# Patient Record
Sex: FEMALE | Race: WHITE | NOT HISPANIC OR LATINO
[De-identification: names, ages, dates, MRNs, and addresses within clinical notes are randomized per-mention and may not be internally consistent; named-entity substitution may affect disease eponyms.]

---

## 2021-11-23 ENCOUNTER — TRANSCRIPTION ENCOUNTER (OUTPATIENT)
Age: 21
End: 2021-11-23

## 2023-02-17 PROBLEM — Z00.00 ENCOUNTER FOR PREVENTIVE HEALTH EXAMINATION: Status: ACTIVE | Noted: 2023-02-17

## 2023-02-19 ENCOUNTER — OUTPATIENT (OUTPATIENT)
Dept: OUTPATIENT SERVICES | Facility: HOSPITAL | Age: 23
LOS: 1 days | End: 2023-02-19
Payer: COMMERCIAL

## 2023-02-19 DIAGNOSIS — M54.6 PAIN IN THORACIC SPINE: ICD-10-CM

## 2023-02-19 DIAGNOSIS — Z00.8 ENCOUNTER FOR OTHER GENERAL EXAMINATION: ICD-10-CM

## 2023-02-19 PROCEDURE — 72157 MRI CHEST SPINE W/O & W/DYE: CPT | Mod: 26

## 2023-02-19 PROCEDURE — A9579: CPT

## 2023-02-19 PROCEDURE — 72157 MRI CHEST SPINE W/O & W/DYE: CPT

## 2023-02-20 DIAGNOSIS — M54.6 PAIN IN THORACIC SPINE: ICD-10-CM

## 2023-04-04 ENCOUNTER — OUTPATIENT (OUTPATIENT)
Dept: OUTPATIENT SERVICES | Facility: HOSPITAL | Age: 23
LOS: 1 days | End: 2023-04-04
Payer: COMMERCIAL

## 2023-04-04 DIAGNOSIS — Z00.8 ENCOUNTER FOR OTHER GENERAL EXAMINATION: ICD-10-CM

## 2023-04-04 DIAGNOSIS — M50.20 OTHER CERVICAL DISC DISPLACEMENT, UNSPECIFIED CERVICAL REGION: ICD-10-CM

## 2023-04-04 PROCEDURE — 72141 MRI NECK SPINE W/O DYE: CPT

## 2023-04-04 PROCEDURE — 72141 MRI NECK SPINE W/O DYE: CPT | Mod: 26

## 2023-04-05 DIAGNOSIS — M50.20 OTHER CERVICAL DISC DISPLACEMENT, UNSPECIFIED CERVICAL REGION: ICD-10-CM

## 2023-08-26 ENCOUNTER — NON-APPOINTMENT (OUTPATIENT)
Age: 23
End: 2023-08-26

## 2023-08-27 ENCOUNTER — EMERGENCY (EMERGENCY)
Facility: HOSPITAL | Age: 23
LOS: 0 days | Discharge: HOME | End: 2023-08-27
Attending: STUDENT IN AN ORGANIZED HEALTH CARE EDUCATION/TRAINING PROGRAM
Payer: COMMERCIAL

## 2023-08-27 VITALS
HEART RATE: 105 BPM | RESPIRATION RATE: 19 BRPM | SYSTOLIC BLOOD PRESSURE: 111 MMHG | DIASTOLIC BLOOD PRESSURE: 58 MMHG | TEMPERATURE: 97 F | OXYGEN SATURATION: 98 %

## 2023-08-27 VITALS
HEART RATE: 109 BPM | TEMPERATURE: 98 F | DIASTOLIC BLOOD PRESSURE: 64 MMHG | SYSTOLIC BLOOD PRESSURE: 101 MMHG | OXYGEN SATURATION: 99 % | RESPIRATION RATE: 19 BRPM

## 2023-08-27 DIAGNOSIS — Z88.2 ALLERGY STATUS TO SULFONAMIDES: ICD-10-CM

## 2023-08-27 DIAGNOSIS — Z88.0 ALLERGY STATUS TO PENICILLIN: ICD-10-CM

## 2023-08-27 DIAGNOSIS — R21 RASH AND OTHER NONSPECIFIC SKIN ERUPTION: ICD-10-CM

## 2023-08-27 DIAGNOSIS — L27.0 GENERALIZED SKIN ERUPTION DUE TO DRUGS AND MEDICAMENTS TAKEN INTERNALLY: ICD-10-CM

## 2023-08-27 DIAGNOSIS — T36.8X5A ADVERSE EFFECT OF OTHER SYSTEMIC ANTIBIOTICS, INITIAL ENCOUNTER: ICD-10-CM

## 2023-08-27 LAB
ALBUMIN SERPL ELPH-MCNC: 4.8 G/DL — SIGNIFICANT CHANGE UP (ref 3.5–5.2)
ALP SERPL-CCNC: 88 U/L — SIGNIFICANT CHANGE UP (ref 30–115)
ALT FLD-CCNC: 97 U/L — HIGH (ref 0–41)
ANION GAP SERPL CALC-SCNC: 13 MMOL/L — SIGNIFICANT CHANGE UP (ref 7–14)
AST SERPL-CCNC: 119 U/L — HIGH (ref 0–41)
BASOPHILS # BLD AUTO: 0 K/UL — SIGNIFICANT CHANGE UP (ref 0–0.2)
BASOPHILS NFR BLD AUTO: 0 % — SIGNIFICANT CHANGE UP (ref 0–1)
BILIRUB SERPL-MCNC: 0.2 MG/DL — SIGNIFICANT CHANGE UP (ref 0.2–1.2)
BUN SERPL-MCNC: 7 MG/DL — LOW (ref 10–20)
CALCIUM SERPL-MCNC: 9 MG/DL — SIGNIFICANT CHANGE UP (ref 8.4–10.5)
CHLORIDE SERPL-SCNC: 100 MMOL/L — SIGNIFICANT CHANGE UP (ref 98–110)
CO2 SERPL-SCNC: 24 MMOL/L — SIGNIFICANT CHANGE UP (ref 17–32)
CREAT SERPL-MCNC: 0.7 MG/DL — SIGNIFICANT CHANGE UP (ref 0.7–1.5)
EGFR: 124 ML/MIN/1.73M2 — SIGNIFICANT CHANGE UP
EOSINOPHIL # BLD AUTO: 0.03 K/UL — SIGNIFICANT CHANGE UP (ref 0–0.7)
EOSINOPHIL NFR BLD AUTO: 0.9 % — SIGNIFICANT CHANGE UP (ref 0–8)
GIANT PLATELETS BLD QL SMEAR: PRESENT — SIGNIFICANT CHANGE UP
GLUCOSE SERPL-MCNC: 96 MG/DL — SIGNIFICANT CHANGE UP (ref 70–99)
HCG SERPL QL: NEGATIVE — SIGNIFICANT CHANGE UP
HCT VFR BLD CALC: 40.4 % — SIGNIFICANT CHANGE UP (ref 37–47)
HGB BLD-MCNC: 13.3 G/DL — SIGNIFICANT CHANGE UP (ref 12–16)
LACTATE SERPL-SCNC: 1.1 MMOL/L — SIGNIFICANT CHANGE UP (ref 0.7–2)
LYMPHOCYTES # BLD AUTO: 0.13 K/UL — LOW (ref 1.2–3.4)
LYMPHOCYTES # BLD AUTO: 4.4 % — LOW (ref 20.5–51.1)
MANUAL SMEAR VERIFICATION: SIGNIFICANT CHANGE UP
MCHC RBC-ENTMCNC: 31.8 PG — HIGH (ref 27–31)
MCHC RBC-ENTMCNC: 32.9 G/DL — SIGNIFICANT CHANGE UP (ref 32–37)
MCV RBC AUTO: 96.7 FL — SIGNIFICANT CHANGE UP (ref 81–99)
MONOCYTES # BLD AUTO: 0.1 K/UL — SIGNIFICANT CHANGE UP (ref 0.1–0.6)
MONOCYTES NFR BLD AUTO: 3.5 % — SIGNIFICANT CHANGE UP (ref 1.7–9.3)
MYELOCYTES NFR BLD: 0.9 % — HIGH (ref 0–0)
NEUTROPHILS # BLD AUTO: 2.7 K/UL — SIGNIFICANT CHANGE UP (ref 1.4–6.5)
NEUTROPHILS NFR BLD AUTO: 85.1 % — HIGH (ref 42.2–75.2)
NEUTS BAND # BLD: 5.2 % — SIGNIFICANT CHANGE UP (ref 0–6)
NRBC # BLD: 2 /100 — HIGH (ref 0–0)
NRBC # BLD: SIGNIFICANT CHANGE UP /100 WBCS (ref 0–0)
PLAT MORPH BLD: NORMAL — SIGNIFICANT CHANGE UP
PLATELET # BLD AUTO: 118 K/UL — LOW (ref 130–400)
PMV BLD: 9.2 FL — SIGNIFICANT CHANGE UP (ref 7.4–10.4)
POTASSIUM SERPL-MCNC: 4.5 MMOL/L — SIGNIFICANT CHANGE UP (ref 3.5–5)
POTASSIUM SERPL-SCNC: 4.5 MMOL/L — SIGNIFICANT CHANGE UP (ref 3.5–5)
PROT SERPL-MCNC: 7.3 G/DL — SIGNIFICANT CHANGE UP (ref 6–8)
RBC # BLD: 4.18 M/UL — LOW (ref 4.2–5.4)
RBC # FLD: 13.9 % — SIGNIFICANT CHANGE UP (ref 11.5–14.5)
RBC BLD AUTO: NORMAL — SIGNIFICANT CHANGE UP
SODIUM SERPL-SCNC: 137 MMOL/L — SIGNIFICANT CHANGE UP (ref 135–146)
WBC # BLD: 2.99 K/UL — LOW (ref 4.8–10.8)
WBC # FLD AUTO: 2.99 K/UL — LOW (ref 4.8–10.8)

## 2023-08-27 PROCEDURE — 85025 COMPLETE CBC W/AUTO DIFF WBC: CPT

## 2023-08-27 PROCEDURE — 83605 ASSAY OF LACTIC ACID: CPT

## 2023-08-27 PROCEDURE — 36415 COLL VENOUS BLD VENIPUNCTURE: CPT

## 2023-08-27 PROCEDURE — 99284 EMERGENCY DEPT VISIT MOD MDM: CPT

## 2023-08-27 PROCEDURE — 99283 EMERGENCY DEPT VISIT LOW MDM: CPT

## 2023-08-27 PROCEDURE — 80053 COMPREHEN METABOLIC PANEL: CPT

## 2023-08-27 PROCEDURE — 84703 CHORIONIC GONADOTROPIN ASSAY: CPT

## 2023-08-27 RX ORDER — SODIUM CHLORIDE 9 MG/ML
1000 INJECTION, SOLUTION INTRAVENOUS ONCE
Refills: 0 | Status: COMPLETED | OUTPATIENT
Start: 2023-08-27 | End: 2023-08-27

## 2023-08-27 RX ORDER — DIPHENHYDRAMINE HCL 50 MG
25 CAPSULE ORAL ONCE
Refills: 0 | Status: COMPLETED | OUTPATIENT
Start: 2023-08-27 | End: 2023-08-27

## 2023-08-27 RX ORDER — ACETAMINOPHEN 500 MG
650 TABLET ORAL ONCE
Refills: 0 | Status: COMPLETED | OUTPATIENT
Start: 2023-08-27 | End: 2023-08-27

## 2023-08-27 RX ADMIN — Medication 20 MILLIGRAM(S): at 20:43

## 2023-08-27 RX ADMIN — Medication 325 MILLIGRAM(S): at 18:40

## 2023-08-27 RX ADMIN — SODIUM CHLORIDE 1000 MILLILITER(S): 9 INJECTION, SOLUTION INTRAVENOUS at 18:40

## 2023-08-27 RX ADMIN — Medication 25 MILLIGRAM(S): at 20:43

## 2023-08-27 NOTE — ED PROVIDER NOTE - CLINICAL SUMMARY MEDICAL DECISION MAKING FREE TEXT BOX
23-year-old female no past medical presenting with rash.  States that she is on her last day of Bactrim today, developed a fever on Friday and then had a diffuse erythematous rash to the body.  Rash began last night.  Denies any oral mucosal involvement.  Denies any conjunctivitis.  Denies any dysuria. Denies any vomiting or diarrhea.  States that she was on Bactrim for Bartholin's abscess which has now completely resolved. exam with Erythematous rash to body, no oral mucosal department.  No conjunctivitis.  Labs reviewed, LFTs less than 3 times upper limit of normal.  Vitals remained stable.  Offered admission however patient preferred to be discharged with close follow-up.  She is very strict return precautions ghanshyam in detail with the patient and her mother including sloughing of skin, blistering, progression of rash, conjunctivitis, oral mucosal involvement, vomiting or diarrhea.  Patient understands to return immediately for reevaluation should they develop.

## 2023-08-27 NOTE — ED ADULT NURSE REASSESSMENT NOTE - NS ED NURSE REASSESS COMMENT FT1
Pt reassessed A/O times 4 Vs stable report filling slight better medication is given as per order did eat 755 tolerated well ,pt is seen evaluate by ED attending clear  to go home with family members NAD noted verbalize understanding  of instruction given . Pt reassessed A/O times 4 Vs stable report filling slight better medication is given as per order did eat 75% tolerated well ,pt is seen evaluate by ED attending clear  to go home with family members NAD noted verbalize understanding  of instruction given .

## 2023-08-27 NOTE — ED PROVIDER NOTE - OBJECTIVE STATEMENT
23-year-old female no past medical presenting with rash.  States that she is on her last day of Bactrim today, developed a fever on Friday and then had a diffuse erythematous rash to the body.  Rash began last night.  Denies any oral mucosal involvement.  Denies any conjunctivitis.  Denies any dysuria. Denies any vomiting or diarrhea.  States that she was on Bactrim for Bartholin's abscess which has now completely resolved.

## 2023-08-27 NOTE — ED ADULT TRIAGE NOTE - CHIEF COMPLAINT QUOTE
pt on Bactrim x 1 week for bartholin cyst. pt now has rash and fever. pt states her cyst has improved

## 2023-08-27 NOTE — ED PROVIDER NOTE - PATIENT PORTAL LINK FT
You can access the FollowMyHealth Patient Portal offered by [facility] by registering at the following website: [email or web address]. By joining [employer]’s FollowMyHealth portal, you will also be able to view your health information using other applications (apps) compatible with our system.

## 2023-08-27 NOTE — ED PROVIDER NOTE - CARE PROVIDER_API CALL
Homar Salamanca [facility]  Dermatology  [facility] [place] [address]  Phone: [phone]  Fax: [phone]  Follow Up Time: 1-3 Days

## 2023-08-27 NOTE — ED PROVIDER NOTE - PHYSICAL EXAMINATION
CONSTITUTIONAL: Well-developed; well-nourished; in no acute distress.   SKIN: warm, dry. macular erythematous rash diffusely, no blistering, no sloughing of skin. no oral mucosal involvement. no conjunctivitis.   HEAD: Normocephalic; atraumatic.  EYES: PERRL, EOMI, no conjunctival erythema  ENT: No nasal discharge; airway clear.  NECK: Supple; non tender.  CARD: S1, S2 normal; no murmurs, gallops, or rubs. Regular rate and rhythm.   RESP: No wheezes, rales or rhonchi.  ABD: soft ntnd.  EXT: Normal ROM.  No clubbing, cyanosis or edema.   NEURO: Alert, oriented, grossly unremarkable  PSYCH: Cooperative, appropriate.

## 2023-08-27 NOTE — ED PROVIDER NOTE - PROGRESS NOTE DETAILS
DC: offered admission for monitoring vs dc with steroids and close follow up with derm- patient preferred dc.

## 2023-08-27 NOTE — ED PROVIDER NOTE - NSFOLLOWUPINSTRUCTIONS_ED_ALL_ED_FT
Our Emergency Department Referral Coordinators will be reaching out to you in the next 24-48 hours from 9:00am to 5:00pm with a follow up appointment. Please expect a phone call from the hospital in that time frame. If you do not receive a call or if you have any questions or concerns, you can reach them at   [phone]    Drug Rash  Close-up of red and inflamed skin around a bandage after an injection.   A drug rash occurs when a medicine causes a change in the color or texture of the skin. It can develop minutes, hours, or days after you take the medicine. The rash may appear on a small area of skin or all over your body.    What are the causes?  This condition may be caused by one of these three conditions:  An allergic reaction to the medicine.  An unwanted side effect of a certain medicine.  Extreme sensitivity to sunlight caused by the medicine.  What increases the risk?  If you take any of these medicines that make your skin sensitive to light and are exposed to sunlight, it can make you more likely to develop this condition:  Antibiotics, including tetracyclines and sulfa medicines.  Antifungals.  Antihistamines.  Diuretics.  Retinoids, such as isotretinoin.  Statins.  NSAIDs.  What are the signs or symptoms?  A person scratching their arm.  Symptoms of this condition include:  Redness.  Tiny bumps.  Peeling.  Itching.  Itchy welts (hives).  Swelling.  How is this diagnosed?  This condition may be diagnosed based on:  A physical exam.  Tests to find out which medicine caused the rash. These tests may include:  Skin tests.  Blood tests.  How is this treated?  This condition is treated with medicines, including:  Antihistamine. This may be given to relieve itching.  NSAIDs. These may be given to reduce swelling and to treat pain.  A steroid medicine. This may be given to reduce swelling.  The rash usually goes away when you stop taking the medicine that caused it.    Follow these instructions at home:  Take over-the-counter and prescription medicines only as told by your health care provider.  Tell all your health care providers about any medicine reactions that you have had in the past.  If your rash was caused by sensitivity to sunlight, and while your rash is healing:  Avoid being in the sun if possible, especially when it is strongest, usually between 10 a.m. and 4 p.m.  Cover your skin with pants, long sleeves, and a hat when you are exposed to sunlight.  If you have hives:  Take a cool shower or use a cool compress to relieve itchiness.  Take over-the-counter antihistamines, as recommended by your health care provider, until the hives are gone. Hives are not contagious.  Keep all follow-up visits. This is important.  Contact a health care provider if:  You have fever.  Your rash is not going away.  Your rash gets worse.  Your rash comes back.  You have high-pitched whistling sounds when you breathe, most often when you breathe out (wheezing) or coughing.  Get help right away if:  You start to have breathing problems.  You start to have shortness of breath.  Your face or throat starts to swell.  You have severe weakness with dizziness or fainting.  You have chest pain.  Your skin starts to blister and peel.  These symptoms may represent a serious problem that is an emergency. Do not wait to see if the symptoms will go away. Get medical help right away. Call your local emergency services (911 in the [place] Do not drive yourself to the hospital.    Summary  A drug rash occurs when a medicine causes a change in the color or texture of the skin. The rash may appear on a small area of skin or all over your body.  It can develop minutes, hours, or days after you take the medicine.  Your health care provider will do various tests to determine what medicine caused your rash.  The rash may be treated with medicine to relieve itching, swelling, and pain.  This information is not intended to replace advice given to you by your health care provider. Make sure you discuss any questions you have with your health care provider.

## 2023-12-07 ENCOUNTER — NON-APPOINTMENT (OUTPATIENT)
Age: 23
End: 2023-12-07

## 2024-12-22 ENCOUNTER — NON-APPOINTMENT (OUTPATIENT)
Age: 24
End: 2024-12-22